# Patient Record
Sex: MALE | Race: WHITE | NOT HISPANIC OR LATINO | Employment: OTHER | URBAN - METROPOLITAN AREA
[De-identification: names, ages, dates, MRNs, and addresses within clinical notes are randomized per-mention and may not be internally consistent; named-entity substitution may affect disease eponyms.]

---

## 2020-04-22 ENCOUNTER — HISTORICAL (OUTPATIENT)
Dept: ADMINISTRATIVE | Facility: HOSPITAL | Age: 80
End: 2020-04-22

## 2022-04-11 ENCOUNTER — HISTORICAL (OUTPATIENT)
Dept: ADMINISTRATIVE | Facility: HOSPITAL | Age: 82
End: 2022-04-11

## 2022-04-24 VITALS
WEIGHT: 181 LBS | SYSTOLIC BLOOD PRESSURE: 145 MMHG | DIASTOLIC BLOOD PRESSURE: 77 MMHG | BODY MASS INDEX: 24.52 KG/M2 | HEIGHT: 72 IN

## 2022-05-05 NOTE — HISTORICAL [facility]ER
This is a historical note converted from [place]. Formatting and pictures may have been removed.  Please reference Claudia for original formatting and attached multimedia. Chief Complaint  RT wrist, DOI 4/19/20 was making the bed and wrist was under mattress and felt a pop with severe pain.  History of Present Illness  79-year-old right-hand-dominant male presents office today for evaluation of his right wrist pain.? He was making his bed?3 days ago and lifted the mattress to tuck the sheet under and noted?a pop in the right wrist with?resultant pain and swelling.? His pain is located over the?dorsal aspect of the right wrist with associated swelling.? He does have a history of?kidney disease and does not take?any form of?oral anti-inflammatory.? He denies any numbness or tingling into the fingers  Review of Systems  Systemic: No fever, no chills, and no recent weight change.  Head: No headache - frequent.  Eyes: No vision problems.  Otolarnygeal: No hearing loss, no earache, no epistaxis, no hoarseness, and no tooth pain. Gums normal.  Cardiovascular: No chest pain or discomfort and no palpitations.  Pulmonary: No pulmonary symptoms - no dyspnea, no shortness of breath  Gastrointestinal: Appetite not decreased. No dysphagia and no constant eructation. No nausea, no vomiting, no abdominal pain, no hematochezia.  Genitourinary: No genitourinary symptoms - No urinary hesitancy. No urinary loss of control - no burning sensation during urination.  Musculoskeletal: No calf muscle cramps and no localized soft tissue swelling  Neurological: No fainting and no convulsions.  Psychological: no depression.  Skin: No rash.  Physical Exam  Vitals & Measurements  T:?36.1? ?C (Oral)? HR:?76(Peripheral)? BP:?145/77?  HT:?182?cm? WT:?82.1?kg? BMI:?24.79?  right wrist exam  mild dorsal sided swelling, no erythema or increased heat  tender over distal radius and proximal carpal bones  Full ROM in wrist and elbow joints  intact   strength  NVI distally  negative carpal compression test  ?  Right wrist x-rays taken office today show?no fracture, deformity or lesions.? He does have?joint space narrowing?the?radial scaphoid?articulation.  ?  Assessment/Plan  1.?Osteoarthritis of right wrist?M19.031  ?Recommend a wrist brace,?Voltaren gel?and he will follow-up with us as needed.? We have discussed intra-articular corticosteroid injection but the patient does not wish to have that at this time.  Orders:  diclofenac topical, 2 gm =, TOP, QID, apply 2 gm to affected area 4 times daily, # 100 gm, 5 Refill(s), Pharmacy: [facility] #[id number], 182, cm, Height/Length Dosing, 04/22/20 10:46:00 CDT, 82.1, kg, Weight Dosing, 04/22/20 10:46:00 CDT  Clinic Follow-up PRN, 04/22/20 11:09:00 CDT, Future Order, [facility]  Referrals  Clinic Follow-up PRN, 04/22/20 11:09:00 CDT, Future Order, [facility]   Problem List/Past Medical History  Ongoing  Osteoarthritis of right wrist  Historical  No qualifying data  Procedure/Surgical History  Appendectomy (1963)  Tonsillectomy with adenoidectomy (1946)  cardiac bypass  Carpal tunnel release  catracts  clavical orif  Heart valve replacement  Hernia repair   Medications  Voltaren 1% topical gel, 2 gm, TOP, QID, 5 refills  Allergies  No Known Allergies  Social History  Abuse/Neglect  No, 04/22/2020  Tobacco  Never (less than 100 in lifetime), N/A, 04/22/2020  Health Maintenance  Health Maintenance  ???Pending?(in the next year)  ??? ??OverDue  ??? ? ? ?Advance Directive due??01/01/20??and every 1??year(s)  ??? ? ? ?Cognitive Screening due??01/01/20??and every 1??year(s)  ??? ? ? ?Fall Risk Assessment due??01/01/20??and every 1??year(s)  ??? ? ? ?Geriatric Depression Screening due??01/01/20??and every 1??year(s)  ??? ??Due?  ??? ? ? ?ADL Screening due??04/22/20??and every 1??year(s)  ??? ? ? ?Aspirin Therapy for CVD Prevention due??04/22/20??and every 1??year(s)  ??? ? ? ?Medicare Annual Wellness Exam  due??04/22/20??and every 1??year(s)  ??? ? ? ?Pneumococcal Vaccine due??04/22/20??Variable frequency  ??? ? ? ?Tetanus Vaccine due??04/22/20??and every 10??year(s)  ??? ? ? ?Zoster Vaccine due??04/22/20??and every 100??year(s)  ??? ??Due In Future?  ??? ? ? ?Functional Assessment not due until??01/01/21??and every 1??year(s)  ??? ? ? ?Obesity Screening not due until??01/01/21??and every 1??year(s)  ???Satisfied?(in the past 1 year)  ??? ??Satisfied?  ??? ? ? ?Blood Pressure Screening on??04/22/20.??Satisfied by Lynnette Sethi LPN  ??? ? ? ?Body Mass Index Check on??04/22/20.??Satisfied by Lynnette Sethi LPN  ??? ? ? ?Functional Assessment on??04/22/20.??Satisfied by Lynnette Sethi LPN  ??? ? ? ?Obesity Screening on??04/22/20.??Satisfied by Lynnette Sethi LPN  ?

## 2022-07-25 ENCOUNTER — OFFICE VISIT (OUTPATIENT)
Dept: ORTHOPEDICS | Facility: CLINIC | Age: 82
End: 2022-07-25
Payer: MEDICARE

## 2022-07-25 ENCOUNTER — HOSPITAL ENCOUNTER (OUTPATIENT)
Dept: RADIOLOGY | Facility: CLINIC | Age: 82
Discharge: HOME | End: 2022-07-25
Attending: SPECIALIST
Payer: MEDICARE

## 2022-07-25 VITALS
DIASTOLIC BLOOD PRESSURE: 70 MMHG | HEART RATE: 59 BPM | TEMPERATURE: 98 F | HEIGHT: 72 IN | WEIGHT: 178 LBS | BODY MASS INDEX: 24.11 KG/M2 | SYSTOLIC BLOOD PRESSURE: 132 MMHG

## 2022-07-25 DIAGNOSIS — M25.532 LEFT WRIST PAIN: Primary | ICD-10-CM

## 2022-07-25 DIAGNOSIS — M25.532 LEFT WRIST PAIN: ICD-10-CM

## 2022-07-25 DIAGNOSIS — M18.12 PRIMARY OSTEOARTHRITIS OF FIRST CARPOMETACARPAL JOINT OF LEFT HAND: ICD-10-CM

## 2022-07-25 PROCEDURE — 99213 OFFICE O/P EST LOW 20 MIN: CPT | Mod: ,,, | Performed by: SPECIALIST

## 2022-07-25 PROCEDURE — 73110 XR WRIST COMPLETE 3 VIEWS LEFT: ICD-10-PCS | Mod: LT,,, | Performed by: SPECIALIST

## 2022-07-25 PROCEDURE — 99213 PR OFFICE/OUTPT VISIT, EST, LEVL III, 20-29 MIN: ICD-10-PCS | Mod: ,,, | Performed by: SPECIALIST

## 2022-07-25 PROCEDURE — 73110 X-RAY EXAM OF WRIST: CPT | Mod: LT,,, | Performed by: SPECIALIST

## 2022-07-25 RX ORDER — AMLODIPINE BESYLATE 10 MG/1
10 TABLET ORAL DAILY
COMMUNITY
Start: 2022-03-29

## 2022-07-25 RX ORDER — LOSARTAN POTASSIUM 50 MG/1
50 TABLET ORAL 2 TIMES DAILY
COMMUNITY
Start: 2022-06-07

## 2022-07-25 RX ORDER — GLUCOSAMINE/CHONDRO SU A 500-400 MG
1 TABLET ORAL 2 TIMES DAILY
COMMUNITY

## 2022-07-25 RX ORDER — MONTELUKAST SODIUM 10 MG/1
1 TABLET ORAL NIGHTLY
COMMUNITY
Start: 2022-03-28

## 2022-07-25 RX ORDER — CETIRIZINE HYDROCHLORIDE 10 MG/1
10 TABLET ORAL
COMMUNITY

## 2022-07-25 RX ORDER — ROSUVASTATIN CALCIUM 20 MG/1
1 TABLET, COATED ORAL NIGHTLY
COMMUNITY
Start: 2022-07-06 | End: 2023-07-06

## 2022-07-25 RX ORDER — FLUTICASONE PROPIONATE 50 MCG
SPRAY, SUSPENSION (ML) NASAL
COMMUNITY
Start: 2022-03-07

## 2022-07-25 RX ORDER — GINGER ROOT/GINGER ROOT EXT 262.5 MG
25000 CAPSULE ORAL
COMMUNITY

## 2022-07-25 RX ORDER — TRAZODONE HYDROCHLORIDE 50 MG/1
1 TABLET ORAL NIGHTLY
COMMUNITY
Start: 2022-04-26

## 2022-07-25 RX ORDER — ASPIRIN 81 MG/1
81 TABLET ORAL
COMMUNITY

## 2022-07-25 RX ORDER — TALC
10 POWDER (GRAM) TOPICAL
COMMUNITY

## 2022-07-25 NOTE — PROGRESS NOTES
History of present illness:    82-year-old male presents office today for evaluation his left wrist and thumb pain.  This has been present for couple weeks with no associated injury.  He notes some pain over the base of the thumb with certain movements.  Currently today he is having no pain.  He does want to have this checked out.  History of carpal tunnel release 20 years ago.  Denies any numbness or tingling    Past Medical History:   Diagnosis Date    Disorder of kidney and ureter     High cholesterol     Hypertension        Past Surgical History:   Procedure Laterality Date    APPENDECTOMY  1963    NICANOR eye surgery      cardiac bypass      CARDIAC VALVE REPLACEMENT      CARPAL TUNNEL RELEASE Left     CATARACT EXTRACTION      HERNIA REPAIR      OPEN REDUCTION AND INTERNAL FIXATION (ORIF) OF FRACTURE OF CLAVICLE      TONSILLECTOMY, ADENOIDECTOMY  1946       Current Outpatient Medications   Medication Sig    amLODIPine (NORVASC) 10 MG tablet Take 10 mg by mouth once daily.    aspirin (ECOTRIN) 81 MG EC tablet Take 81 mg by mouth.    beta carotene 53285 UNIT capsule Take 25,000 Units by mouth.    cetirizine (ZYRTEC) 10 MG tablet Take 10 mg by mouth.    fluticasone propionate (FLONASE) 50 mcg/actuation nasal spray 1 spray in each nostril    glucosamine-chondroitin 500-400 mg tablet Take 1 tablet by mouth 2 (two) times daily.    losartan (COZAAR) 50 MG tablet Take 50 mg by mouth 2 (two) times daily.    melatonin (MELATIN) 3 mg tablet Take 10 mg by mouth.    montelukast (SINGULAIR) 10 mg tablet Take 1 tablet by mouth nightly.    rosuvastatin (CRESTOR) 20 MG tablet Take 1 tablet by mouth every evening.    traZODone (DESYREL) 50 MG tablet Take 1 tablet by mouth nightly.    vit C/E/Zn/coppr/lutein/zeaxan (PRESERVISION AREDS-2 ORAL) Take by mouth.     No current facility-administered medications for this visit.       Review of patient's allergies indicates:  No Known Allergies    Family History    Family history unknown: Yes       Social History     Socioeconomic History    Marital status:    Tobacco Use    Smoking status: Former Smoker    Smokeless tobacco: Never Used   Substance and Sexual Activity    Alcohol use: Yes    Drug use: Never         Review of Systems:    Constitution:   Denies chills, fever, and sweats.  HENT:   Denies headaches or blurry vision.  Cardiovascular:  Denies chest pain or irregular heart beat.  Respiratory:   Denies cough or shortness of breath.  Gastrointestinal:  Denies abdominal pain, nausea, or vomiting.  Musculoskeletal:   Denies muscle cramps.  Neurological:   Denies dizziness or focal weakness.  Psychiatric/Behavior: Normal mental status.  Hematology/Lymph:  Denies bleeding problem or easy bruising/bleeding.  Skin:    Denies rash or suspicious lesions.    Examination:    Vital Signs:    Vitals:    07/25/22 1026 07/25/22 1027   BP: 132/70    Pulse: (!) 59    Temp: 97.6 °F (36.4 °C)    Weight: 80.7 kg (178 lb)    Height: 6' (1.829 m)    PainSc: 0-No pain 0-No pain       Body mass index is 24.14 kg/m².    Constitution:   Well-developed, well nourished patient in no acute distress.  Neurological:   Alert and oriented x 3 and cooperative to examination.     Psychiatric/Behavior: Normal mental status.  Respiratory:   No shortness of breath.  Eyes:    Extraoccular muscles intact  Skin:    No scars, rash or suspicious lesions.    Physical Exam:     Left upper extremity exam:  No swelling or erythema  Tender over 1st CMC joint  Negative finklestien   Negative carpal compression  Negative tinel  Intact ROM in all digits  NVI distally    Left wrist xrays taken today, 3 views show advanced joint space narrowing of 1st CMC joint  No fractures        Assessment:     Left wrist pain  -     X-Ray Wrist Complete Left; Future; Expected date: 07/25/2022    Primary osteoarthritis of first carpometacarpal joint of left hand        Plan:      Recommend Voltaren gel as needed.  Also  discussed Tylenol.  Currently asymptomatic.  We have discussed intra-articular corticosteroid injection if his symptoms persist.  Follow-up as needed      DISCLAIMER: This note may have been dictated using voice recognition software and may contain grammatical errors.